# Patient Record
Sex: FEMALE | URBAN - METROPOLITAN AREA
[De-identification: names, ages, dates, MRNs, and addresses within clinical notes are randomized per-mention and may not be internally consistent; named-entity substitution may affect disease eponyms.]

---

## 2017-12-27 ENCOUNTER — HOSPITAL ENCOUNTER (OUTPATIENT)
Dept: LAB | Age: 42
Discharge: HOME | End: 2017-12-27
Payer: SELF-PAY

## 2017-12-27 PROCEDURE — 88175 CYTOPATH C/V AUTO FLUID REDO: CPT | Performed by: OBSTETRICS & GYNECOLOGY

## 2017-12-27 PROCEDURE — 87624 HPV HI-RISK TYP POOLED RSLT: CPT | Performed by: OBSTETRICS & GYNECOLOGY

## 2019-01-11 ENCOUNTER — OFFICE VISIT (OUTPATIENT)
Dept: FAMILY MEDICINE CLINIC | Age: 44
End: 2019-01-11

## 2019-01-11 VITALS
OXYGEN SATURATION: 100 % | TEMPERATURE: 97.7 F | RESPIRATION RATE: 12 BRPM | HEART RATE: 70 BPM | HEIGHT: 66 IN | SYSTOLIC BLOOD PRESSURE: 105 MMHG | DIASTOLIC BLOOD PRESSURE: 64 MMHG | WEIGHT: 179.8 LBS | BODY MASS INDEX: 28.9 KG/M2

## 2019-01-11 DIAGNOSIS — Z11.3 SCREENING EXAMINATION FOR SEXUALLY TRANSMITTED DISEASE: ICD-10-CM

## 2019-01-11 DIAGNOSIS — Z02.89 HISTORY AND PHYSICAL EXAMINATION, IMMIGRATION: Primary | ICD-10-CM

## 2019-01-11 DIAGNOSIS — Z11.1 SCREENING FOR TUBERCULOSIS: ICD-10-CM

## 2019-01-11 NOTE — PATIENT INSTRUCTIONS
Your lab results will be back in 5-6 days. If any of the tests come back positive, you will need a referral to your local Health Department to have parts of the form completed. The only vaccine that you need is a TdaP, and that is usually given about this time in a pregnancy. Check with your OB, make sure that there is not some reason they don't want you to have it, and get it with them, at a pharmacy, or return here.  
 
GOOD LUCK!!!

## 2019-01-11 NOTE — PROGRESS NOTES
HISTORY OF PRESENT ILLNESS Deepak Corley is a 41 y.o. female. Pt is here for immigration PE. No known medical problems. She is currently 30 weeks pregnant, hasn't received her TdaP yet Review of Systems Constitutional: Negative for chills, fever, malaise/fatigue and weight loss. HENT: Negative for congestion and ear pain. Eyes: Negative for discharge and redness. Respiratory: Negative for cough, hemoptysis and shortness of breath. Cardiovascular: Negative for chest pain, palpitations and orthopnea. Gastrointestinal: Negative for abdominal pain, nausea and vomiting. Genitourinary: Negative for hematuria. Neurological: Negative for weakness and headaches. Endo/Heme/Allergies: Does not bruise/bleed easily. Physical Exam  
Constitutional: Vital signs are normal. She appears well-developed and well-nourished.  
gravid HENT:  
Right Ear: Tympanic membrane and ear canal normal.  
Left Ear: Tympanic membrane and ear canal normal.  
Nose: Nose normal.  
Mouth/Throat: Uvula is midline, oropharynx is clear and moist and mucous membranes are normal.  
Eyes: Pupils are equal, round, and reactive to light. Neck: No thyromegaly present. Cardiovascular: Normal rate, regular rhythm and normal heart sounds. Pulmonary/Chest: Effort normal and breath sounds normal. No respiratory distress. She has no wheezes. Abdominal: She exhibits no distension. Lymphadenopathy:  
  She has no cervical adenopathy. Skin: No rash noted. No evidence of drug use Psychiatric: She has a normal mood and affect. Her behavior is normal.  
Nursing note and vitals reviewed. ASSESSMENT and PLAN 
  ICD-10-CM ICD-9-CM 1. History and physical examination, immigration Z02.89 V70.3 RPR  
   QUANTIFERON-TB PLUS(CLIENT INCUB.) N GONORRHOEA AMPLIFICATION  
   COLLECTION VENOUS BLOOD,VENIPUNCTURE 2.  Screening for tuberculosis Z11.1 V74.1 N GONORRHOEA AMPLIFICATION  
 3. Screening examination for sexually transmitted disease Z11.3 V74.5 QUANTIFERON-TB PLUS(CLIENT INCUB.)    COLLECTION VENOUS BLOOD,VENIPUNCTURE

## 2019-01-11 NOTE — PROGRESS NOTES
Rm 2 
Patient presents to the clinic for an immigration physical.  
 
Does patient want flu shot? No,already has for [employer]. Depression Screening: PHQ over the last two weeks 1/11/2019 Little interest or pleasure in doing things Not at all Feeling down, depressed, irritable, or hopeless Not at all Total Score PHQ 2 0 Learning Assessment: 
Learning Assessment 1/11/2019 PRIMARY LEARNER Patient HIGHEST LEVEL OF EDUCATION - PRIMARY LEARNER  SOME COLLEGE  
BARRIERS PRIMARY LEARNER NONE  
CO-LEARNER CAREGIVER No  
PRIMARY LANGUAGE ENGLISH  
LEARNER PREFERENCE PRIMARY DEMONSTRATION  
ANSWERED BY patient RELATIONSHIP SELF Abuse Screening: No flowsheet data found. Health Maintenance reviewed and discussed per provider: yes Coordination of Care: 1. Have you been to the ER, urgent care clinic since your last visit? Hospitalized since your last visit? no 
 
2. Have you seen or consulted any other health care providers outside of the [address] [place] [address] since your last visit? Include any pap smears or colon screening.  No